# Patient Record
Sex: MALE | Race: OTHER | NOT HISPANIC OR LATINO | Employment: FULL TIME | URBAN - METROPOLITAN AREA
[De-identification: names, ages, dates, MRNs, and addresses within clinical notes are randomized per-mention and may not be internally consistent; named-entity substitution may affect disease eponyms.]

---

## 2023-09-26 ENCOUNTER — TRANSCRIBE ORDERS (OUTPATIENT)
Dept: ORTHOPEDIC SURGERY | Facility: HOSPITAL | Age: 28
End: 2023-09-26
Payer: COMMERCIAL

## 2023-09-26 DIAGNOSIS — S83.402D SPRAIN OF COLLATERAL LIGAMENT OF LEFT KNEE, SUBSEQUENT ENCOUNTER: ICD-10-CM

## 2023-09-28 NOTE — PROGRESS NOTES
In 12 visits, XOCHITL HUBBARD will achieve the following goals:     1. Pain levels will be decreased by 2 points on 10 point scale.   2. Patient will tolerate ascending/descending 3 flights of stairs with pain levels increasing no greater than 2 point value.  3. Lower extremity strength will increase by 1/3 MMT grade to facilitate function  4. Patient independent and compliant with HEP as measured by independent recall of 2 or more exercises.  5. Patient returns to work duties without restriction and no increase in L knee pain.   [facility] authorization thru 10/4/23

## 2023-10-03 ENCOUNTER — TREATMENT (OUTPATIENT)
Dept: PHYSICAL THERAPY | Facility: CLINIC | Age: 28
End: 2023-10-03
Payer: COMMERCIAL

## 2023-10-03 DIAGNOSIS — S83.402D SPRAIN OF COLLATERAL LIGAMENT OF LEFT KNEE, SUBSEQUENT ENCOUNTER: ICD-10-CM

## 2023-10-03 DIAGNOSIS — S83.92XA SPRAIN OF LEFT KNEE: Primary | ICD-10-CM

## 2023-10-03 PROCEDURE — 97530 THERAPEUTIC ACTIVITIES: CPT | Mod: GP

## 2023-10-03 NOTE — PROGRESS NOTES
Physical Therapy    Physical Therapy Treatment    Patient Name: Xochitl Main  MRN: [id number]  Today's Date: 10/3/2023         Assessment: all goals achieved or partially achieved. Patient pleased with progress to date and is agreeable to discharge. Information provided on choosing credentialed  to help facilitate personal health and wellness goals.       Plan: discharge at this time       Current Problem  1. Sprain of left knee            Subjective  - visit 7 of 12. End date 10/4  Overall doing much better. No pain.  General - STAIRS: able to climb 3-4 flights of stairs reciprocating, then notices discomfort  Work activities: rarely or occasionally climbs ladder - has not had to perform since injury                      Objective   Ortho     STRENGTH  MMT   Hip Flexion:  L 5/5  Quadriceps:  L 5 /5   Hamstrings:  L 4+/5 equal to RLE  Glute med: L 5/5.                     Outcome Measures:      Treatments:   Reeval as noted in note  Review HEP  Role of  for health and wellness goals        OP EDUCATION:       Goals:  Encounter Problems       Encounter Problems (Active)       PT Problem       PT Goal 1       Start:  10/03/23    Expected End:  10/04/23       Goals: Goals set and discussed today.   In 12 visits, XOCHITL MAIN will achieve the following goals:     1. Pain levels will be decreased by 2 points on 10 point scale.   2. Patient will tolerate ascending/descending 3 flights of stairs with pain levels increasing no greater than 2 point value.  3. Lower extremity strength will increase by 1/3 MMT grade to facilitate function  4. Patient independent and compliant with HEP as measured by independent recall of 2 or more exercises.  5. Patient returns to work duties without restriction and no increase in L knee pain.                    Goal 1 achieved  Goal 2 partially achieved - pain can increase to 3-4/10  Goal 3 achieved for strength  Goal 4 goal achieved  Goal 5 returned to all  normal work activities without issue.

## 2023-10-04 PROBLEM — M75.82 TENDINITIS OF LEFT ROTATOR CUFF: Status: ACTIVE | Noted: 2023-10-04

## 2023-10-04 PROBLEM — D69.6 PLATELETS DECREASED (CMS-HCC): Status: ACTIVE | Noted: 2023-10-04

## 2023-10-04 PROBLEM — L30.0 NUMMULAR ECZEMA: Status: ACTIVE | Noted: 2023-10-04

## 2023-10-04 PROBLEM — S62.609A: Status: ACTIVE | Noted: 2023-10-04

## 2023-10-04 PROBLEM — J01.90 ACUTE SINUSITIS: Status: ACTIVE | Noted: 2023-10-04

## 2023-10-04 PROBLEM — L85.3 XEROSIS CUTIS: Status: ACTIVE | Noted: 2023-07-03

## 2023-10-04 PROBLEM — S61.319A: Status: ACTIVE | Noted: 2023-10-04

## 2023-10-04 PROBLEM — L73.8 OTHER SPECIFIED FOLLICULAR DISORDERS: Status: ACTIVE | Noted: 2023-07-03

## 2023-10-04 PROBLEM — R21 RASH AND OTHER NONSPECIFIC SKIN ERUPTION: Status: ACTIVE | Noted: 2023-07-03

## 2023-10-04 PROBLEM — M25.552 LEFT HIP PAIN: Status: ACTIVE | Noted: 2023-10-04

## 2023-10-04 PROBLEM — M75.42 IMPINGEMENT SYNDROME OF LEFT SHOULDER: Status: ACTIVE | Noted: 2023-10-04

## 2023-10-04 PROBLEM — M25.512 LEFT SHOULDER PAIN: Status: ACTIVE | Noted: 2023-10-04

## 2023-10-04 PROBLEM — A46 ERYSIPELAS: Status: ACTIVE | Noted: 2023-10-04

## 2023-10-04 PROBLEM — B35.4 TINEA CORPORIS: Status: ACTIVE | Noted: 2023-07-03

## 2023-10-04 PROBLEM — L30.8 PRURITIC DERMATITIS: Status: ACTIVE | Noted: 2023-10-04

## 2023-10-04 PROBLEM — B35.6 TINEA CRURIS: Status: ACTIVE | Noted: 2023-07-03

## 2023-10-04 PROBLEM — R07.9 CHEST PAIN NOT DUE TO ACUTE CORONARY SYNDROME: Status: ACTIVE | Noted: 2023-10-04

## 2023-10-04 PROBLEM — L03.90 CELLULITIS: Status: ACTIVE | Noted: 2023-10-04

## 2023-10-04 PROBLEM — M89.9 SCAPULAR DYSFUNCTION: Status: ACTIVE | Noted: 2023-10-04

## 2023-10-04 PROBLEM — M94.0 COSTOCHONDRITIS: Status: ACTIVE | Noted: 2023-10-04

## 2023-10-04 PROBLEM — B35.8 OTHER DERMATOPHYTOSES: Status: ACTIVE | Noted: 2023-07-03

## 2023-10-04 RX ORDER — CICLOPIROX 1 G/100ML
1 SHAMPOO TOPICAL
COMMUNITY
Start: 2023-07-03

## 2023-10-04 RX ORDER — BENZONATATE 200 MG/1
200 CAPSULE ORAL 3 TIMES DAILY PRN
COMMUNITY
Start: 2019-03-11

## 2023-10-04 RX ORDER — KETOCONAZOLE 20 MG/ML
1 SHAMPOO, SUSPENSION TOPICAL
COMMUNITY
Start: 2023-07-03

## 2023-10-04 RX ORDER — KETOCONAZOLE 20 MG/G
1 CREAM TOPICAL
COMMUNITY
Start: 2019-05-09

## 2023-10-04 RX ORDER — TERBINAFINE HYDROCHLORIDE 250 MG/1
250 TABLET ORAL
COMMUNITY
Start: 2019-05-22

## 2023-10-04 RX ORDER — IBUPROFEN 600 MG/1
600 TABLET ORAL EVERY 6 HOURS PRN
COMMUNITY
Start: 2019-03-11

## 2023-10-04 RX ORDER — ECONAZOLE NITRATE 10 MG/G
CREAM TOPICAL
COMMUNITY
Start: 2023-07-03

## 2023-10-04 RX ORDER — CLINDAMYCIN PHOSPHATE 10 UG/ML
1 LOTION TOPICAL
COMMUNITY
Start: 2021-10-22

## 2023-10-04 RX ORDER — HYDROCORTISONE 25 MG/G
CREAM TOPICAL
COMMUNITY
Start: 2021-04-06

## 2023-10-04 RX ORDER — MELOXICAM 15 MG/1
1 TABLET ORAL DAILY
COMMUNITY
Start: 2020-01-31

## 2023-10-10 ENCOUNTER — APPOINTMENT (OUTPATIENT)
Dept: PHYSICAL THERAPY | Facility: CLINIC | Age: 28
End: 2023-10-10
Payer: COMMERCIAL

## 2023-10-13 ENCOUNTER — APPOINTMENT (OUTPATIENT)
Dept: PHYSICAL THERAPY | Facility: CLINIC | Age: 28
End: 2023-10-13
Payer: COMMERCIAL